# Patient Record
Sex: FEMALE | Race: WHITE | Employment: OTHER | URBAN - METROPOLITAN AREA
[De-identification: names, ages, dates, MRNs, and addresses within clinical notes are randomized per-mention and may not be internally consistent; named-entity substitution may affect disease eponyms.]

---

## 2017-06-26 ENCOUNTER — OFFICE VISIT (OUTPATIENT)
Dept: OBGYN | Facility: CLINIC | Age: 72
End: 2017-06-26
Payer: COMMERCIAL

## 2017-06-26 VITALS — BODY MASS INDEX: 33.81 KG/M2 | SYSTOLIC BLOOD PRESSURE: 100 MMHG | WEIGHT: 176 LBS | DIASTOLIC BLOOD PRESSURE: 56 MMHG

## 2017-06-26 DIAGNOSIS — N99.3 PROLAPSE OF VAGINAL VAULT AFTER HYSTERECTOMY: Primary | ICD-10-CM

## 2017-06-26 PROCEDURE — 99213 OFFICE O/P EST LOW 20 MIN: CPT | Performed by: OBSTETRICS & GYNECOLOGY

## 2017-06-26 RX ORDER — FENOFIBRATE 145 MG/1
TABLET, COATED ORAL
COMMUNITY
Start: 2017-04-26

## 2017-06-26 RX ORDER — LEVOFLOXACIN 500 MG/1
TABLET, FILM COATED ORAL
Refills: 0 | COMMUNITY
Start: 2017-03-04 | End: 2017-06-26

## 2017-06-26 RX ORDER — OSELTAMIVIR PHOSPHATE 75 MG/1
CAPSULE ORAL
Refills: 0 | COMMUNITY
Start: 2017-02-03 | End: 2017-06-26

## 2017-06-26 RX ORDER — PHENAZOPYRIDINE HYDROCHLORIDE 200 MG/1
TABLET, FILM COATED ORAL
Refills: 0 | COMMUNITY
Start: 2017-03-04 | End: 2017-06-26

## 2017-06-26 RX ORDER — ZOLEDRONIC ACID 5 MG/100ML
5 INJECTION, SOLUTION INTRAVENOUS
COMMUNITY
Start: 2013-03-05 | End: 2019-05-30

## 2017-06-26 RX ORDER — OMEGA-3 FATTY ACIDS/FISH OIL 300-1000MG
1 CAPSULE ORAL
COMMUNITY
End: 2017-06-26

## 2017-06-26 NOTE — MR AVS SNAPSHOT
After Visit Summary   6/26/2017    Fernanda Chacko    MRN: [id number]           Patient Information     Date Of Birth          1945        Visit Information        Provider Department      6/26/2017 9:15 AM Taj Gilmore MD [facility] [place]        Today's Diagnoses     Prolapse of vaginal vault after hysterectomy    -  1       Follow-ups after your visit        Who to contact     If you have questions or need follow up information about today's clinic visit or your schedule please contact [facility] [place] directly at [phone].  Normal or non-critical lab and imaging results will be communicated to you by Telirishart, letter or phone within 4 business days after the clinic has received the results. If you do not hear from us within 7 days, please contact the clinic through Anacompt or phone. If you have a critical or abnormal lab result, we will notify you by phone as soon as possible.  Submit refill requests through [email or web address] or call your pharmacy and they will forward the refill request to us. Please allow 3 business days for your refill to be completed.          Additional Information About Your Visit        MyChart Information     [email or web address] gives you secure access to your electronic health record. If you see a primary care provider, you can also send messages to your care team and make appointments. If you have questions, please call your primary care clinic.  If you do not have a primary care provider, please call [phone] and they will assist you.        Care EveryWhere ID     This is your Care EveryWhere ID. This could be used by other organizations to access your [place] medical records  [id number]        Your Vitals Were     Breastfeeding? BMI (Body Mass Index)                No 33.81 kg/m2           Blood Pressure from Last 3 Encounters:   06/26/17 100/56   12/19/16 138/66   05/27/16 138/55    Weight from Last 3 Encounters:   06/26/17 176 lb (79.8  kg)   12/19/16 175 lb (79.4 kg)   05/24/16 176 lb 5.9 oz (80 kg)              Today, you had the following     No orders found for display       Primary Care Provider Office Phone # Fax #    Miriam Lau [phone]       [facility] [address]  [place] [address]        Equal Access to Services     [facility]: Hadii aad ku hadasho Soomaali, waaxda luqadaha, qaybta kaalmada adeegyada, waxay idiin hayaan adeeg shmuelcameroncesar laarikn . So [facility] [phone].    ATENCIÓN: Si habla español, tiene a kc disposición servicios gratuitos de asistencia lingüística. [facility] [phone].    We comply with applicable federal civil rights laws and [place] laws. We do not discriminate on the basis of race, color, national origin, age, disability sex, sexual orientation or gender identity.            Thank you!     Thank you for choosing [facility] [place]  for your care. Our goal is always to provide you with excellent care. Hearing back from our patients is one way we can continue to improve our services. Please take a few minutes to complete the written survey that you may receive in the mail after your visit with us. Thank you!             Your Updated Medication List - Protect others around you: Learn how to safely use, store and throw away your medicines at www.disposemymeds.org.          This list is accurate as of: 6/26/17  9:28 AM.  Always use your most recent med list.                   Brand Name Dispense Instructions for use Diagnosis    amitriptyline 10 MG tablet    ELAVIL     Take 10 mg by mouth nightly as needed        amLODIPine 10 MG tablet    NORVASC     Take 10 mg by mouth every evening        aspirin EC 81 MG EC tablet      Take 81 mg by mouth daily        atorvastatin 20 MG tablet    LIPITOR     Take 20 mg by mouth At Bedtime        calcium carbonate 500 MG chewable tablet    TUMS     Take 1 chew tab by mouth daily as needed        COREG PO      Take 25 mg by mouth 2 times  daily (with meals).        COZAAR PO      Take 25 mg by mouth At Bedtime        fenofibrate 145 MG tablet           multivitamin, therapeutic Tabs tablet      Take 1 tablet by mouth daily        omeprazole 20 MG CR capsule    priLOSEC     Take 20 mg by mouth daily as needed        RECLAST 5 MG/100ML Soln infusion   Generic drug:  zoledronic Acid      Inject 5 mg into the vein        VITAMIN D (CHOLECALCIFEROL) PO      Take 5,000 Units by mouth daily        ZOLOFT PO      Take 25 mg by mouth At Bedtime

## 2017-06-26 NOTE — PROGRESS NOTES
SUBJECTIVE:                                                   Fernanda Chacko is a 71 year old female who presents to clinic today for the following health issue(s):  Patient presents with:  RECHECK        HPI: The patient is seen in follow-up fairly severe vaginal prolapse with a history of hysterectomy and colporrhaphy. She still has some urgency and wears a pad on a daily basis. She feels no pressure symptoms. She comes to see us once a year for follow-up. Unfortunately her weight has gone back up and this is putting more pressure on her pelvic floor.      No LMP recorded. Patient has had a hysterectomy..   Patient is sexually active, No obstetric history on file..  Using hysterectomy for contraception.    reports that she quit smoking about 29 years ago. Her smoking use included Cigarettes. She has a 46.00 pack-year smoking history. She has never used smokeless tobacco.    STD testing offered?  Declined    Health maintenance updated:  yes    Today's PHQ-2 Score:   PHQ-2 ( 1999 Pfizer) 12/10/2015   Q1: Little interest or pleasure in doing things 0   Q2: Feeling down, depressed or hopeless 0   PHQ-2 Score 0     Today's PHQ-9 Score: No flowsheet data found.  Today's GARRY-7 Score: No flowsheet data found.    Problem list and histories reviewed & adjusted, as indicated.  Additional history: as documented.    Patient Active Problem List   Diagnosis     Enterocele     Vaginal prolapse     Dysuria     Morbid obesity due to excess calories (H)     Need for prophylactic measure     Anemia, unspecified     Intestinal malabsorption, unspecified     Adverse effect of iron     Arthritis of knee, left     Advance care planning     Past Surgical History:   Procedure Laterality Date     ARTHROPLASTY KNEE Left 5/24/2016    Procedure: ARTHROPLASTY KNEE;  Surgeon: Mark Jurado MD;  Location:  OR     ARTHROSCOPY KNEE       ARTHROSCOPY KNEE  4/6/2012    Procedure:ARTHROSCOPY KNEE; LEFT KNEE ARTHROSCOPY, Medial  Menisectomy, Medial Femoral Condroplasty; Surgeon:AMRIT CASTILLO; Location:[facility]     COLONOSCOPY       COLPORRHAPHY ANTERIOR, POSTERIOR, COMBINED N/A 9/2/2014    Procedure: COMBINED COLPORRHAPHY ANTERIOR, POSTERIOR;  Surgeon: Taj Gilmore MD;  Location: [facility]     COLPORRHAPHY POSTERIOR N/A 4/28/2015    Procedure: COLPORRHAPHY POSTERIOR;  Surgeon: Taj Gilmore MD;  Location: [facility]     LAPAROSCOPIC ASSISTED HYSTERECTOMY VAGINAL, BILATERAL SALPINGO-OOPHORECTOMY, COMBINED Bilateral 9/2/2014    Procedure: COMBINED LAPAROSCOPIC ASSISTED HYSTERECTOMY VAGINAL, SALPINGO-OOPHORECTOMY;  Surgeon: Taj Gilmore MD;  Location: [facility]     RECTOCELE REPAIR      rectocele and repair enterocele     RELEASE TRIGGER FINGER       TONSILLECTOMY & ADENOIDECTOMY       TUBAL LIGATION        Social History   Substance Use Topics     Smoking status: Former Smoker     Packs/day: 2.30     Years: 20.00     Types: Cigarettes     Quit date: 1/1/1988     Smokeless tobacco: Never Used     Alcohol use Yes      Comment: occasional           Current Outpatient Prescriptions   Medication Sig     fenofibrate 145 MG tablet      zoledronic Acid (RECLAST) 5 MG/100ML SOLN infusion Inject 5 mg into the vein     multivitamin, therapeutic (THERA-VIT) TABS Take 1 tablet by mouth daily     VITAMIN D, CHOLECALCIFEROL, PO Take 5,000 Units by mouth daily     Sertraline HCl (ZOLOFT PO) Take 25 mg by mouth At Bedtime      amitriptyline (ELAVIL) 10 MG tablet Take 10 mg by mouth nightly as needed      omeprazole (PRILOSEC) 20 MG capsule Take 20 mg by mouth daily as needed      amLODIPine (NORVASC) 10 MG tablet Take 10 mg by mouth every evening      aspirin EC 81 MG tablet Take 81 mg by mouth daily      atorvastatin (LIPITOR) 20 MG tablet Take 20 mg by mouth At Bedtime      Losartan Potassium (COZAAR PO) Take 25 mg by mouth At Bedtime      calcium carbonate (TUMS) 500 MG chewable tablet Take 1 chew tab by mouth daily as needed      Carvedilol (COREG PO)  Take 25 mg by mouth 2 times daily (with meals).     No current facility-administered medications for this visit.      Allergies   Allergen Reactions     Lisinopril Cough     Clindamycin Rash     Clindamycin Base Rash       ROS:  12 point review of systems negative other than symptoms noted below.  Genitourinary: Incontinence    OBJECTIVE:     /56  Wt 176 lb (79.8 kg)  Breastfeeding? No  BMI 33.81 kg/m2  Body mass index is 33.81 kg/(m^2).    Exam:  Constitutional:  Appearance: Well nourished, well developed alert, in no acute distress  Gastrointestinal:  Abdominal Examination:  Abdomen nontender to palpation, tone normal without rigidity or guarding, no masses present, umbilicus without lesions; Liver/Spleen:  No hepatomegaly present, liver nontender to palpation; Hernias:  No hernias present  Lymphatic: Lymph Nodes:  No other lymphadenopathy present  Skin:General Inspection:  No rashes present, no lesions present, no areas of discoloration; Genitalia and Groin:  No rashes present, no lesions present, no areas of discoloration, no masses present.  Neurologic/Psychiatric:  Mental Status:  Oriented X3   Pelvic Exam:  External Genitalia:     Normal appearance for age, no discharge present, no tenderness present, no inflammatory lesions present, color normal  Vagina:     Normal vaginal vault without central or paravaginal defects, no discharge present, no inflammatory lesions present, no masses present  Bladder:     Nontender to palpation, good ant and apical support  Urethra:   Urethral Body:  Urethra palpation normal, urethra structural support normal   Urethral Meatus:  No erythema or lesions present  Cervix:     Surgically absent  Uterus:     Surgically absent  Adnexa:     Surgically absent  Perineum:     Perineum within normal limits, no evidence of trauma, no rashes or skin lesions present  Anus:     Anus within normal limits, no hemorrhoids present  Inguinal Lymph Nodes:     No lymphadenopathy present      In-Clinic Test Results:      ASSESSMENT/PLAN:                                                        71-year-old patient seen at this time for follow-up of extensive vaginal surgery for prolapse. She has excellent support. I think her incontinence issue surrounds her weight a long history of urgency and some probable overflow. She is in [employer] again and will continue to try to minimize her lifting and we will see her in 1 year.        Taj Gilmore MD  [facility] [place]

## 2017-10-20 ENCOUNTER — TRANSFERRED RECORDS (OUTPATIENT)
Dept: HEALTH INFORMATION MANAGEMENT | Facility: CLINIC | Age: 72
End: 2017-10-20

## 2018-05-29 ENCOUNTER — OFFICE VISIT (OUTPATIENT)
Dept: OBGYN | Facility: CLINIC | Age: 73
End: 2018-05-29
Payer: COMMERCIAL

## 2018-05-29 VITALS
DIASTOLIC BLOOD PRESSURE: 56 MMHG | SYSTOLIC BLOOD PRESSURE: 120 MMHG | WEIGHT: 172 LBS | HEIGHT: 61 IN | HEART RATE: 72 BPM | BODY MASS INDEX: 32.47 KG/M2

## 2018-05-29 DIAGNOSIS — K46.9 ENTEROCELE: Primary | ICD-10-CM

## 2018-05-29 PROCEDURE — 99212 OFFICE O/P EST SF 10 MIN: CPT | Performed by: OBSTETRICS & GYNECOLOGY

## 2018-05-29 NOTE — PROGRESS NOTES
SUBJECTIVE:                                                   Fernanda Chacko is a 72 year old female who presents to clinic today for the following health issue(s):  Patient presents with:  RECHECK        HPI: The patient is seen at this time in follow-up of extensive vaginal repair with hysterectomy in 2014.  She is continued to be good about her lifting.  She has no pressure symptoms but still has a very unstable bladder with urgency.      No LMP recorded. Patient has had a hysterectomy..   Patient is sexually active, No obstetric history on file..  Using hysterectomy for contraception.    reports that she quit smoking about 30 years ago. Her smoking use included Cigarettes. She has a 46.00 pack-year smoking history. She has never used smokeless tobacco.    STD testing offered?  Declined    Health maintenance updated:  yes    Today's PHQ-2 Score:   PHQ-2 ( 1999 Pfizer) 5/29/2018   Q1: Little interest or pleasure in doing things 0   Q2: Feeling down, depressed or hopeless 0   PHQ-2 Score 0     Today's PHQ-9 Score: No flowsheet data found.  Today's GARRY-7 Score: No flowsheet data found.    Problem list and histories reviewed & adjusted, as indicated.  Additional history: as documented.    Patient Active Problem List   Diagnosis     Enterocele     Vaginal prolapse     Dysuria     Morbid obesity due to excess calories (H)     Need for prophylactic measure     Anemia, unspecified     Intestinal malabsorption, unspecified     Adverse effect of iron     Arthritis of knee, left     Advance care planning     Past Surgical History:   Procedure Laterality Date     ARTHROPLASTY KNEE Left 5/24/2016    Procedure: ARTHROPLASTY KNEE;  Surgeon: Amrit Castillo MD;  Location:  OR     ARTHROSCOPY KNEE       ARTHROSCOPY KNEE  4/6/2012    Procedure:ARTHROSCOPY KNEE; LEFT KNEE ARTHROSCOPY, Medial Menisectomy, Medial Femoral Condroplasty; Surgeon:AMRIT CASTILLO; Location: [place]     COLONOSCOPY       COLPORRHAPHY  ANTERIOR, POSTERIOR, COMBINED N/A 9/2/2014    Procedure: COMBINED COLPORRHAPHY ANTERIOR, POSTERIOR;  Surgeon: Taj Gilmore MD;  Location: [facility]     COLPORRHAPHY POSTERIOR N/A 4/28/2015    Procedure: COLPORRHAPHY POSTERIOR;  Surgeon: Taj Gilmore MD;  Location: [facility]     LAPAROSCOPIC ASSISTED HYSTERECTOMY VAGINAL, BILATERAL SALPINGO-OOPHORECTOMY, COMBINED Bilateral 9/2/2014    Procedure: COMBINED LAPAROSCOPIC ASSISTED HYSTERECTOMY VAGINAL, SALPINGO-OOPHORECTOMY;  Surgeon: Taj Gilmore MD;  Location: [facility]     RECTOCELE REPAIR      rectocele and repair enterocele     RELEASE TRIGGER FINGER       TONSILLECTOMY & ADENOIDECTOMY       TUBAL LIGATION        Social History   Substance Use Topics     Smoking status: Former Smoker     Packs/day: 2.30     Years: 20.00     Types: Cigarettes     Quit date: 1/1/1988     Smokeless tobacco: Never Used     Alcohol use Yes      Comment: occasional           Current Outpatient Prescriptions   Medication Sig     amitriptyline (ELAVIL) 10 MG tablet Take 10 mg by mouth nightly as needed      amLODIPine (NORVASC) 10 MG tablet Take 10 mg by mouth every evening      aspirin EC 81 MG tablet Take 81 mg by mouth daily      atorvastatin (LIPITOR) 20 MG tablet Take 20 mg by mouth At Bedtime      calcium carbonate (TUMS) 500 MG chewable tablet Take 1 chew tab by mouth daily as needed      Carvedilol (COREG PO) Take 25 mg by mouth 2 times daily (with meals).     fenofibrate 145 MG tablet      Losartan Potassium (COZAAR PO) Take 25 mg by mouth At Bedtime      multivitamin, therapeutic (THERA-VIT) TABS Take 1 tablet by mouth daily     omeprazole (PRILOSEC) 20 MG capsule Take 20 mg by mouth daily as needed      Sertraline HCl (ZOLOFT PO) Take 25 mg by mouth At Bedtime      VITAMIN D, CHOLECALCIFEROL, PO Take 5,000 Units by mouth daily     zoledronic Acid (RECLAST) 5 MG/100ML SOLN infusion Inject 5 mg into the vein     No current facility-administered medications for this visit.   "    Allergies   Allergen Reactions     Lisinopril Cough     Clindamycin Rash     Clindamycin Base Rash       ROS:  12 point review of systems negative other than symptoms noted below.  Genitourinary: Incontinence and Urgency    OBJECTIVE:     /56  Pulse 72  Ht 5' 0.5\" (1.537 m)  Wt 172 lb (78 kg)  BMI 33.04 kg/m2  Body mass index is 33.04 kg/(m^2).    Exam:  Constitutional:  Appearance: Well nourished, well developed alert, in no acute distress  Gastrointestinal:  Abdominal Examination:  Abdomen nontender to palpation, tone normal without rigidity or guarding, no masses present, umbilicus without lesions; Liver/Spleen:  No hepatomegaly present, liver nontender to palpation; Hernias:  No hernias present  Lymphatic: Lymph Nodes:  No other lymphadenopathy present  Skin:General Inspection:  No rashes present, no lesions present, no areas of discoloration; Genitalia and Groin:  No rashes present, no lesions present, no areas of discoloration, no masses present.  Neurologic/Psychiatric:  Mental Status:  Oriented X3   Pelvic Exam:  External Genitalia:     Normal appearance for age, no discharge present, no tenderness present, no inflammatory lesions present, color normal  Vagina:     Normal vaginal vault without central or paravaginal defects, no discharge present, no inflammatory lesions present, no masses present  Bladder:     Nontender to palpation  Urethra:   Urethral Body:  Urethra palpation normal, urethra structural support normal   Urethral Meatus:  No erythema or lesions present  Cervix:     Surgically absent  Uterus:     Surgically absent  Adnexa:     Surgically absent  Perineum:     Perineum within normal limits, no evidence of trauma, no rashes or skin lesions present  Anus:     Anus within normal limits, no hemorrhoids present  Inguinal Lymph Nodes:     No lymphadenopathy present     In-Clinic Test Results:      ASSESSMENT/PLAN:                                                      The patient is seen " at this time for follow-up of uterine prolapse with symptomatic cystocele enterocele and rectocele.  She has excellent apical anterior and posterior support.  We have asked her to continue to stay away from any heavy lifting.  This is complicated by a recent shoulder injury by her .  We will see her back in 1 year.        Taj Gilmore MD  [facility] [place]

## 2018-05-29 NOTE — MR AVS SNAPSHOT
After Visit Summary   5/29/2018    Fernanda Chacko    MRN: [id number]           Patient Information     Date Of Birth          1945        Visit Information        Provider Department      5/29/2018 4:15 PM Taj Gilmore MD [facility]        Today's Diagnoses     Enterocele    -  1       Follow-ups after your visit        Your next 10 appointments already scheduled     May 29, 2018  4:15 PM CDT   SHORT with Taj Gilmore MD   [facility]    [address] [place] [id number] [phone]              Who to contact     If you have questions or need follow up information about today's clinic visit or your schedule please contact [facility] directly at [phone].  Normal or non-critical lab and imaging results will be communicated to you by O[employer] Open Systems Resourceshart, letter or phone within 4 business days after the clinic has received the results. If you do not hear from us within 7 days, please contact the clinic through [employer] Resourceshart or phone. If you have a critical or abnormal lab result, we will notify you by phone as soon as possible.  Submit refill requests through BigFix or call your pharmacy and they will forward the refill request to us. Please allow 3 business days for your refill to be completed.          Additional Information About Your Visit        MyChart Information     BigFix gives you secure access to your electronic health record. If you see a primary care provider, you can also send messages to your care team and make appointments. If you have questions, please call your primary care clinic.  If you do not have a primary care provider, please call [phone] and they will assist you.        Care EveryWhere ID     This is your Care EveryWhere ID. This could be used by other organizations to access your [place] medical records  [id number]        Your Vitals Were      "Pulse Height BMI (Body Mass Index)             72 5' 0.5\" (1.537 m) 33.04 kg/m2          Blood Pressure from Last 3 Encounters:   05/29/18 120/56   06/26/17 100/56   12/19/16 138/66    Weight from Last 3 Encounters:   05/29/18 172 lb (78 kg)   06/26/17 176 lb (79.8 kg)   12/19/16 175 lb (79.4 kg)              Today, you had the following     No orders found for display       Primary Care Provider Office Phone # Fax #    Miriam Lau [phone]       [facility] [address] [facility] [place] [address]        Equal Access to Services     ART POST : Evaristo Salazar, yevgeniy hinojosa, jose coelho, los peck. So [facility] [phone].    ATENCIÓN: Si habla español, tiene a kc disposición servicios gratuitos de asistencia lingüística. Llame al [phone].    We comply with applicable federal civil rights laws and [place] laws. We do not discriminate on the basis of race, color, national origin, age, disability, sex, sexual orientation, or gender identity.            Thank you!     Thank you for choosing [facility]  for your care. Our goal is always to provide you with excellent care. Hearing back from our patients is one way we can continue to improve our services. Please take a few minutes to complete the written survey that you may receive in the mail after your visit with us. Thank you!             Your Updated Medication List - Protect others around you: Learn how to safely use, store and throw away your medicines at www.disposemymeds.org.          This list is accurate as of 5/29/18  4:07 PM.  Always use your most recent med list.                   Brand Name Dispense Instructions for use Diagnosis    amitriptyline 10 MG tablet    ELAVIL     Take 10 mg by mouth nightly as needed        amLODIPine 10 MG tablet    NORVASC     Take 10 mg by mouth every evening        aspirin 81 MG EC tablet      Take 81 mg by mouth daily "        atorvastatin 20 MG tablet    LIPITOR     Take 20 mg by mouth At Bedtime        calcium carbonate 500 MG chewable tablet    TUMS     Take 1 chew tab by mouth daily as needed        COREG PO      Take 25 mg by mouth 2 times daily (with meals).        COZAAR PO      Take 25 mg by mouth At Bedtime        fenofibrate 145 MG tablet           multivitamin, therapeutic Tabs tablet      Take 1 tablet by mouth daily        omeprazole 20 MG CR capsule    priLOSEC     Take 20 mg by mouth daily as needed        RECLAST 5 MG/100ML Soln infusion   Generic drug:  zoledronic Acid      Inject 5 mg into the vein        VITAMIN D (CHOLECALCIFEROL) PO      Take 5,000 Units by mouth daily        ZOLOFT PO      Take 25 mg by mouth At Bedtime

## 2019-03-11 ENCOUNTER — TELEPHONE (OUTPATIENT)
Dept: OBGYN | Facility: CLINIC | Age: 74
End: 2019-03-11

## 2019-03-11 NOTE — TELEPHONE ENCOUNTER
Please abstract the following data from this visit with this patient into the appropriate field in Epic: Care Everywhere    Mammogram done on this date: 10/20/2017 (approximately), by this group: [facility], results were Normal.

## 2019-05-22 NOTE — PROGRESS NOTES
SUBJECTIVE:                                                   Fernanda Chacko is a 73 year old female who presents to clinic today for the following health issue(s):  Patient presents with:  RECHECK:  follow-up of extensive vaginal repair with hysterectomy in 2014        HPI: The patient is seen at this time in long-term follow-up of extensive vaginal prolapse repair in 2014.  She has no stress component and no pressure.  She still complains of urgency frequency and does wear a small pad.      No LMP recorded. Patient has had a hysterectomy..   Patient is not sexually active, No obstetric history on file..  Using hysterectomy for contraception.    reports that she quit smoking about 31 years ago. Her smoking use included cigarettes. She has a 46.00 pack-year smoking history. She has never used smokeless tobacco.    STD testing offered?  Declined    Health maintenance updated: yes  Today's PHQ-2 Score:   PHQ-2 ( 1999 [employer]) 5/30/2019   Q1: Little interest or pleasure in doing things 0   Q2: Feeling down, depressed or hopeless 0   PHQ-2 Score 0     Today's PHQ-9 Score: No flowsheet data found.  Today's GARRY-7 Score: No flowsheet data found.    Problem list and histories reviewed & adjusted, as indicated.  Additional history: as documented.    Patient Active Problem List   Diagnosis     Enterocele     Vaginal prolapse     Dysuria     Morbid obesity due to excess calories (H)     Need for prophylactic measure     Anemia, unspecified     Intestinal malabsorption, unspecified     Adverse effect of iron     Arthritis of knee, left     Advance care planning     Past Surgical History:   Procedure Laterality Date     ARTHROPLASTY KNEE Left 5/24/2016    Procedure: ARTHROPLASTY KNEE;  Surgeon: Amrit Castillo MD;  Location:  OR     ARTHROSCOPY KNEE       ARTHROSCOPY KNEE  4/6/2012    Procedure:ARTHROSCOPY KNEE; LEFT KNEE ARTHROSCOPY, Medial Menisectomy, Medial Femoral Condroplasty; Surgeon:AMIRT CASTILLO;  Location:[facility]     COLONOSCOPY       COLPORRHAPHY ANTERIOR, POSTERIOR, COMBINED N/A 2014    Procedure: COMBINED COLPORRHAPHY ANTERIOR, POSTERIOR;  Surgeon: Taj Gilmore MD;  Location: [facility]     COLPORRHAPHY POSTERIOR N/A 2015    Procedure: COLPORRHAPHY POSTERIOR;  Surgeon: Taj Gilmore MD;  Location: [facility]     LAPAROSCOPIC ASSISTED HYSTERECTOMY VAGINAL, BILATERAL SALPINGO-OOPHORECTOMY, COMBINED Bilateral 2014    Procedure: COMBINED LAPAROSCOPIC ASSISTED HYSTERECTOMY VAGINAL, SALPINGO-OOPHORECTOMY;  Surgeon: Taj Gilmore MD;  Location: [facility]     RECTOCELE REPAIR      rectocele and repair enterocele     RELEASE TRIGGER FINGER       TONSILLECTOMY & ADENOIDECTOMY       TUBAL LIGATION        Social History     Tobacco Use     Smoking status: Former Smoker     Packs/day: 2.30     Years: 20.00     Pack years: 46.00     Types: Cigarettes     Last attempt to quit: 1988     Years since quittin.4     Smokeless tobacco: Never Used   Substance Use Topics     Alcohol use: Yes     Comment: occasional           Current Outpatient Medications   Medication Sig     amitriptyline (ELAVIL) 10 MG tablet Take 10 mg by mouth nightly as needed      amLODIPine (NORVASC) 10 MG tablet Take 10 mg by mouth every evening      aspirin EC 81 MG tablet Take 81 mg by mouth daily      atorvastatin (LIPITOR) 20 MG tablet Take 20 mg by mouth At Bedtime      calcium carbonate (TUMS) 500 MG chewable tablet Take 1 chew tab by mouth daily as needed      fenofibrate 145 MG tablet      hydrochlorothiazide (HYDRODIURIL) 25 MG tablet Take 25 mg by mouth     Losartan Potassium (COZAAR PO) Take 100 mg by mouth At Bedtime      metoprolol succinate ER (TOPROL-XL) 100 MG 24 hr tablet Take 100 mg by mouth     multivitamin, therapeutic (THERA-VIT) TABS Take 1 tablet by mouth daily     omeprazole (PRILOSEC) 20 MG capsule Take 20 mg by mouth daily as needed      Sertraline HCl (ZOLOFT PO) Take 25 mg by mouth At Bedtime      VITAMIN  "D, CHOLECALCIFEROL, PO Take 5,000 Units by mouth daily     No current facility-administered medications for this visit.      Allergies   Allergen Reactions     Lisinopril Cough     Clindamycin Rash     Clindamycin Base Rash       ROS:  12 point review of systems negative other than symptoms noted below.    OBJECTIVE:     /60   Pulse 76   Ht 1.537 m (5' 0.5\")   Wt 80.3 kg (177 lb)   BMI 34.00 kg/m    Body mass index is 34 kg/m .    Exam:  Constitutional:  Appearance: Well nourished, well developed alert, in no acute distress  Gastrointestinal:  Abdominal Examination:  Abdomen nontender to palpation, tone normal without rigidity or guarding, no masses present, umbilicus without lesions; Liver/Spleen:  No hepatomegaly present, liver nontender to palpation; Hernias:  No hernias present  Lymphatic: Lymph Nodes:  No other lymphadenopathy present  Skin:General Inspection:  No rashes present, no lesions present, no areas of discoloration; Genitalia and Groin:  No rashes present, no lesions present, no areas of discoloration, no masses present.  Neurologic/Psychiatric:  Mental Status:  Oriented X3   Pelvic Exam:  External Genitalia:     Normal appearance for age, no discharge present, no tenderness present, no inflammatory lesions present, color normal  Vagina:     Normal vaginal vault without central or paravaginal defects, no discharge present, no inflammatory lesions present, no masses present  Bladder:     Nontender to palpation  Urethra:   Urethral Body:  Urethra palpation normal, urethra structural support normal   Urethral Meatus:  No erythema or lesions present  Cervix:     Surgically absent  Uterus:     Surgically absent  Adnexa:     Surgically absent  Perineum:     Perineum within normal limits, no evidence of trauma, no rashes or skin lesions present  Anus:     Anus within normal limits, no hemorrhoids present  Inguinal Lymph Nodes:     No lymphadenopathy present     In-Clinic Test " Results:      ASSESSMENT/PLAN:                                                        The patient has excellent support anteriorly apically and posteriorly.  There was no prolapse at all.  We discussed antispasmodic medications but she would rather just wear a light pad and continue to avoid caffeine and lifting.  We will see her in 1 year.          Taj Gilmore MD  [facility] FOR [facility]

## 2019-05-30 ENCOUNTER — OFFICE VISIT (OUTPATIENT)
Dept: OBGYN | Facility: CLINIC | Age: 74
End: 2019-05-30
Payer: MEDICARE

## 2019-05-30 VITALS
DIASTOLIC BLOOD PRESSURE: 60 MMHG | HEART RATE: 76 BPM | SYSTOLIC BLOOD PRESSURE: 122 MMHG | HEIGHT: 61 IN | BODY MASS INDEX: 33.42 KG/M2 | WEIGHT: 177 LBS

## 2019-05-30 DIAGNOSIS — E66.01 MORBID OBESITY DUE TO EXCESS CALORIES (H): ICD-10-CM

## 2019-05-30 DIAGNOSIS — N81.3 UTEROVAGINAL PROLAPSE, COMPLETE: Primary | ICD-10-CM

## 2019-05-30 PROCEDURE — 99212 OFFICE O/P EST SF 10 MIN: CPT | Performed by: OBSTETRICS & GYNECOLOGY

## 2019-05-30 RX ORDER — HYDROCHLOROTHIAZIDE 25 MG/1
25 TABLET ORAL
COMMUNITY
Start: 2019-03-27

## 2019-05-30 RX ORDER — METOPROLOL SUCCINATE 100 MG/1
100 TABLET, EXTENDED RELEASE ORAL
COMMUNITY

## 2019-05-30 ASSESSMENT — MIFFLIN-ST. JEOR: SCORE: 1242.31

## 2019-11-02 ENCOUNTER — HEALTH MAINTENANCE LETTER (OUTPATIENT)
Age: 74
End: 2019-11-02

## 2020-02-10 ENCOUNTER — HEALTH MAINTENANCE LETTER (OUTPATIENT)
Age: 75
End: 2020-02-10

## 2020-11-16 ENCOUNTER — HEALTH MAINTENANCE LETTER (OUTPATIENT)
Age: 75
End: 2020-11-16

## 2021-04-03 ENCOUNTER — HEALTH MAINTENANCE LETTER (OUTPATIENT)
Age: 76
End: 2021-04-03

## 2021-09-12 ENCOUNTER — HEALTH MAINTENANCE LETTER (OUTPATIENT)
Age: 76
End: 2021-09-12

## 2021-09-28 PROCEDURE — 88305 TISSUE EXAM BY PATHOLOGIST: CPT | Mod: TC,ORL | Performed by: INTERNAL MEDICINE

## 2021-09-29 ENCOUNTER — LAB REQUISITION (OUTPATIENT)
Dept: LAB | Facility: CLINIC | Age: 76
End: 2021-09-29
Payer: COMMERCIAL

## 2021-09-29 DIAGNOSIS — K31.89 OTHER DISEASES OF STOMACH AND DUODENUM: ICD-10-CM

## 2021-09-29 DIAGNOSIS — R93.3 ABNORMAL FINDINGS ON DIAGNOSTIC IMAGING OF OTHER PARTS OF DIGESTIVE TRACT: ICD-10-CM

## 2021-09-29 DIAGNOSIS — K44.9 DIAPHRAGMATIC HERNIA WITHOUT OBSTRUCTION OR GANGRENE: ICD-10-CM

## 2021-09-29 DIAGNOSIS — R21 RASH AND OTHER NONSPECIFIC SKIN ERUPTION: ICD-10-CM

## 2021-09-30 LAB
PATH REPORT.COMMENTS IMP SPEC: NORMAL
PATH REPORT.COMMENTS IMP SPEC: NORMAL
PATH REPORT.FINAL DX SPEC: NORMAL
PATH REPORT.GROSS SPEC: NORMAL
PATH REPORT.MICROSCOPIC SPEC OTHER STN: NORMAL
PATH REPORT.RELEVANT HX SPEC: NORMAL
PHOTO IMAGE: NORMAL

## 2021-09-30 PROCEDURE — 88305 TISSUE EXAM BY PATHOLOGIST: CPT | Mod: 26 | Performed by: PATHOLOGY

## 2022-02-27 ENCOUNTER — HEALTH MAINTENANCE LETTER (OUTPATIENT)
Age: 77
End: 2022-02-27

## 2022-04-24 ENCOUNTER — HEALTH MAINTENANCE LETTER (OUTPATIENT)
Age: 77
End: 2022-04-24

## 2022-11-19 ENCOUNTER — HEALTH MAINTENANCE LETTER (OUTPATIENT)
Age: 77
End: 2022-11-19

## 2023-06-01 ENCOUNTER — HEALTH MAINTENANCE LETTER (OUTPATIENT)
Age: 78
End: 2023-06-01